# Patient Record
Sex: FEMALE | Race: WHITE | Employment: FULL TIME | URBAN - METROPOLITAN AREA
[De-identification: names, ages, dates, MRNs, and addresses within clinical notes are randomized per-mention and may not be internally consistent; named-entity substitution may affect disease eponyms.]

---

## 2017-03-22 ENCOUNTER — TELEPHONE (OUTPATIENT)
Dept: BARIATRICS/WEIGHT MGMT | Age: 57
End: 2017-03-22

## 2017-07-24 ENCOUNTER — OFFICE VISIT (OUTPATIENT)
Dept: SURGERY | Age: 57
End: 2017-07-24

## 2017-07-24 VITALS
BODY MASS INDEX: 31.21 KG/M2 | WEIGHT: 182.8 LBS | RESPIRATION RATE: 16 BRPM | SYSTOLIC BLOOD PRESSURE: 120 MMHG | HEART RATE: 67 BPM | HEIGHT: 64 IN | DIASTOLIC BLOOD PRESSURE: 72 MMHG | TEMPERATURE: 98.3 F | OXYGEN SATURATION: 98 %

## 2017-07-24 DIAGNOSIS — M79.3 PANNICULITIS: Primary | ICD-10-CM

## 2017-07-24 PROCEDURE — 99205 OFFICE O/P NEW HI 60 MIN: CPT | Performed by: SURGERY

## 2017-07-24 ASSESSMENT — ENCOUNTER SYMPTOMS
ABDOMINAL PAIN: 0
NAUSEA: 0
DOUBLE VISION: 0
PHOTOPHOBIA: 0
SORE THROAT: 0
VOMITING: 0
SHORTNESS OF BREATH: 0
BACK PAIN: 0
CONSTIPATION: 0
WHEEZING: 0
BLURRED VISION: 0
COUGH: 0
DIARRHEA: 0
HEARTBURN: 1

## 2017-08-29 ENCOUNTER — TELEPHONE (OUTPATIENT)
Dept: SURGERY | Age: 57
End: 2017-08-29

## 2017-08-29 NOTE — TELEPHONE ENCOUNTER
Per Sasha Trotter with [facility], panniculectomy has been denied as not medically necessary. The policy requires photos show pannus hangs below the pubic bone. [facility] would also need to know what conservative treatments were tried to reduce the rash. The treatments would need to have been tried for a period of 3 months or longer. Fax clinicals within 10 days to fax # [phone] and e-mail photos to [email or web address].  Reference # [id number]

## 2017-10-16 ENCOUNTER — OFFICE VISIT (OUTPATIENT)
Dept: SURGERY | Age: 57
End: 2017-10-16

## 2017-10-16 VITALS
WEIGHT: 176.8 LBS | HEIGHT: 64 IN | TEMPERATURE: 98.4 F | RESPIRATION RATE: 16 BRPM | HEART RATE: 78 BPM | BODY MASS INDEX: 30.19 KG/M2 | SYSTOLIC BLOOD PRESSURE: 124 MMHG | DIASTOLIC BLOOD PRESSURE: 76 MMHG | OXYGEN SATURATION: 99 %

## 2017-10-16 DIAGNOSIS — M79.3 PANNICULITIS: Primary | ICD-10-CM

## 2017-10-16 PROCEDURE — 99215 OFFICE O/P EST HI 40 MIN: CPT | Performed by: SURGERY

## 2017-10-16 ASSESSMENT — ENCOUNTER SYMPTOMS
SORE THROAT: 0
PHOTOPHOBIA: 0
SHORTNESS OF BREATH: 0
BACK PAIN: 0
WHEEZING: 0
HEARTBURN: 1
BLURRED VISION: 0
CONSTIPATION: 0
ABDOMINAL PAIN: 0
COUGH: 0
DIARRHEA: 0
VOMITING: 0
DOUBLE VISION: 0
NAUSEA: 0

## 2017-10-16 NOTE — PROGRESS NOTES
[facility]      CC: Body contouring    HPI: 62 y.o. female with a PMHx significant for weight loss who presents in consultation for body contouring. She underwent a sleeve 9/12 and has done well from that time period. She is still losing weight (30 lbs in the past 5 months) after she had gained some of the weight back. She has had intermittent rashes beneath her lowest abdominal fold which she has had intermittent relief with prescription cream from her PCP (yeast infections especially worse during summer months). She is interested in abdominal contouring as well as her inner thighs. She presents back as she has continued to lose weight and has reached weight stability. She has had persistent rashes without improvement by her prior therapies. .    Max weight (lbs): 283  Lowest weight (lbs): 132 (age 25)   Current (lbs): 182.8  Weight change in the past 3 months: No   Max BMI: 50.5  Current BMI: 30.8  History of DVT/PE: Yes--after hysterectomy (one in PE & two DVT), on warfarin for 4 years (has not been on medications for years - patient desired to \"opt out\" and is being managed by her PCP)     Age of obesity onset: 13 years ago (moved to [place])   Previous body contouring procedures: No                     Location: NA  Smoking: No                                  Time since quitting: NA     Pregnancy / Miscarriage: 3/0    Past Medical History:   Diagnosis Date    Anxiety     Anxiety disorder 2000    No meds needed:resolved.  Chicken pox     DVT (deep venous thrombosis) (HCC)     2 in left leg    Morbid obesity (HCC)     Nausea & vomiting     Pap smear     [id number]:nml. Under care of OB-GYN: Dr. Lynda Melgar Pulmonary embolus [facility]) 2/2008    1.5yrsxcoumadin. No meds since then. Etiology:due to KEATON as per pt'.  S/P colonoscopy 1995    Done due to \"colon spasms\":nml. Pt' declining further testing:updated 7/22/10    Screening mammogram     2005:negative.      Thyroid disease     hypo     Past Surgical History:   Procedure Laterality Date     SECTION      HYSTERECTOMY, TOTAL ABDOMINAL  2007    Due to Menometrorrhagia [626.2G][    SLEEVE GASTRECTOMY  12    TUBAL LIGATION      UPPER GASTROINTESTINAL ENDOSCOPY  8/10/12    biopsy     Social History     Social History    Marital status:      Spouse name: N/A    Number of children: N/A    Years of education: N/A     Occupational History    [employer](Foreclosure )      Social History Main Topics    Smoking status: Never Smoker    Smokeless tobacco: Never Used      Comment: Pt states she has not smoked since age 25    Alcohol use No    Drug use: No    Sexual activity: Not on file     Other Topics Concern    Not on file     Social History Narrative    No narrative on file     Family History   Problem Relation Age of Onset    Depression Mother     Seizures Brother     Stroke Maternal Grandmother     Cancer Daughter      lymphoma       Allergy:   Allergies   Allergen Reactions    Penicillins      As child    Codeine Nausea And Vomiting       ROS: Review of Systems   Constitutional: Positive for malaise/fatigue and weight loss (Intentional). Negative for chills and fever. HENT: Negative for congestion, ear pain, hearing loss, sore throat and tinnitus. Eyes: Negative for blurred vision, double vision and photophobia. Respiratory: Negative for cough, shortness of breath and wheezing. Cardiovascular: Negative for chest pain, palpitations and leg swelling. Gastrointestinal: Positive for heartburn (Occasional). Negative for abdominal pain, constipation, diarrhea, nausea and vomiting. Genitourinary: Negative for dysuria, frequency and urgency. Musculoskeletal: Positive for joint pain (Due to increase in exercise) and myalgias. Negative for back pain and neck pain. Skin: Positive for itching (Under lowest abdominal fold) and rash.    Neurological: Negative

## 2017-10-27 ENCOUNTER — HOSPITAL ENCOUNTER (OUTPATIENT)
Dept: MAMMOGRAPHY | Age: 57
Discharge: HOME | End: 2017-10-27
Attending: FAMILY MEDICINE | Admitting: FAMILY MEDICINE

## 2017-10-27 DIAGNOSIS — Z12.31 VISIT FOR SCREENING MAMMOGRAM: ICD-10-CM

## 2017-11-07 ENCOUNTER — TELEPHONE (OUTPATIENT)
Dept: SURGERY | Age: 57
End: 2017-11-07

## 2017-11-08 ENCOUNTER — OFFICE VISIT (OUTPATIENT)
Dept: SURGERY | Age: 57
End: 2017-11-08

## 2017-11-08 ENCOUNTER — HOSPITAL ENCOUNTER (OUTPATIENT)
Dept: ULTRASOUND IMAGING | Age: 57
Discharge: HOME | End: 2017-11-08
Attending: SURGERY | Admitting: SURGERY

## 2017-11-08 VITALS
SYSTOLIC BLOOD PRESSURE: 125 MMHG | HEART RATE: 67 BPM | HEIGHT: 63 IN | WEIGHT: 177 LBS | DIASTOLIC BLOOD PRESSURE: 84 MMHG | BODY MASS INDEX: 31.36 KG/M2

## 2017-11-08 DIAGNOSIS — Z76.89 ENCOUNTER TO ESTABLISH CARE: ICD-10-CM

## 2017-11-08 DIAGNOSIS — N64.59 INVERSION OF NIPPLE: ICD-10-CM

## 2017-11-08 DIAGNOSIS — Z80.3 FAMILY HISTORY OF BREAST CANCER: ICD-10-CM

## 2017-11-08 DIAGNOSIS — N64.59 INVERSION OF NIPPLE: Primary | ICD-10-CM

## 2017-11-08 PROCEDURE — 99244 OFF/OP CNSLTJ NEW/EST MOD 40: CPT | Performed by: SURGERY

## 2017-11-08 RX ORDER — KETOCONAZOLE 20 MG/G
CREAM TOPICAL
Refills: 1 | COMMUNITY
Start: 2017-10-03

## 2017-11-08 RX ORDER — ALPRAZOLAM 0.5 MG/1
TABLET ORAL
COMMUNITY
Start: 2015-12-11

## 2017-11-08 NOTE — PROGRESS NOTES
Patient Name: Christian Ross  YOB: 1960  Primary Care Physician: Jackie Gasca MD    Chief Complaint:  Patient presents secondary to complaint of nipple inversion. She reports that about 1- 1.5 months ago she noticed that the left nipple was inverted. She denies any discharge and or scaling/flaking/itching. She states that it is easily everted with manipulation and also does chandra on it's own sometimes. She denies any bilateral palpable masses/lesions. She denies any bilateral skin changes/erythema/thickening/dimpling. She does report a 50-60 pound weight loss over the last 3-6 months. She does have a family history of breast cancer in her mother diagnosed at age early 63's       Breast History:  Personal History of Breast Cancer: no  History of High Risk Lesion: no  History of Benign Breast Lesion:no  History of Previous Breast Biopsy:no  Self Breast Exams Completed:yes-monthly  Family History of Breast Cancer:yes-see above  Family History of Other Cancers:no  Ashkenazi Yazidism Decent:no  Bra Size: 45 C    Gyne History:  : 3, Para: 3, Spont: 0, Therapeutic: 0  Age of Menarche: 6 years  Age of Menopause: 52 years   LMP: N/A  Are periods regular: N/A  Age of first pregnancy: 25 years  Age of first live Birth: 25 years  Breast Feeding (total time): yes - 27 months  History of Hysterectomy / KEATON-BSO: KEATON-BSO  (age 52) secondary to pain  History of OCP's/HRT:OCPs x 15 years, discontinued ; HRT x 1 month, discontinued   Last PAP:   History of Ovarian Cancer: no  Family History of Ovarian Cancer: no  History of Gyne Surgery: yes-see above, , tubal ligation    Past Medical History:   Diagnosis Date    Anxiety     Anxiety disorder     No meds needed:resolved.  Chicken pox     DVT (deep venous thrombosis) (HCC)     2 in left leg    Morbid obesity (HCC)     Nausea & vomiting     Pap smear     [id number]:nml.   Under care of OB-GYN: Dr. Dorian Todd Pulmonary embolus ([facility] [address] 2008    1.5yrsxcoumadin. No meds since then. Etiology:due to KEATON as per pt'.  S/P colonoscopy     Done due to \"colon spasms\":nml. Pt' declining further testing:updated 7/22/10    Screening mammogram     :negative.  Thyroid disease     hypo       Past Surgical History:   Procedure Laterality Date     SECTION      HYSTERECTOMY, TOTAL ABDOMINAL  2007    Due to Menometrorrhagia [626.2G][    SLEEVE GASTRECTOMY  12    TUBAL LIGATION      UPPER GASTROINTESTINAL ENDOSCOPY  8/10/12    biopsy          Current Outpatient Prescriptions   Medication Sig Dispense Refill    ketoconazole (NIZORAL) 2 % cream APPLY TOPICALLY DAILY. 1    naltrexone-bupropion (CONTRAVE) 8-90 MG per extended release tablet Take 2 tablets by mouth 2 times daily      Multiple Vitamin (MULTIVITAMIN, BARIATRIC FUSION COMPLETE, CHEW TAB) Take 1 tablet by mouth daily.  Levothyroxine Sodium 50 MCG CAPS Take  by mouth daily.  ALPRAZolam (XANAX) 0.5 MG tablet TAKE ONE-HALF TO ONE TABLET BY MOUTH DAILY AS NEEDED       No current facility-administered medications for this visit. Social History     Social History    Marital status:      Spouse name: N/A    Number of children: N/A    Years of education: N/A     Occupational History    [employer](Foreclosure )      Social History Main Topics    Smoking status: Never Smoker    Smokeless tobacco: Never Used      Comment: Pt states she has not smoked since age 25    Alcohol use No    Drug use: No    Sexual activity: Not on file     Other Topics Concern    Not on file     Social History Narrative    No narrative on file       OBJECTIVE:    Vitals:   BP (!) 148/83   Pulse 76   Ht 5' 3\" (1.6 m)   Wt 177 lb (80.3 kg)   Breastfeeding?  No   BMI 31.35 kg/m²             ROS  Constitutional: no weight loss, fever, night sweats   Skin: negative  Cardiovascular: no chest pain or palpitations   Pulmonary: contour and tissue in her breast secondary to the weight loss. 4. Return to clinic six months, sooner if needed  5. Continue yearly mammograms  6. Continue monthly self breast exams  7. Thank you for allowing me to participate in the care of your patient. A total of 20 minutes was spent face to face/involved with this patient, Greater than 50% was spent counseling, discussing imaging findings/report, reviewing her chart and answering her questions.

## 2017-11-15 NOTE — TELEPHONE ENCOUNTER
Photos did not fax over correctly. Need to be sent to e-mail address: [email or web address]. (Call taken by Alfredo Jiménez MA.)

## 2017-11-15 NOTE — TELEPHONE ENCOUNTER
Sent clinicals + photos to e-mail address: [email or web address]. (Address originally provided was incorrect.)

## 2017-11-16 NOTE — TELEPHONE ENCOUNTER
Per Hilda Whitten, patient's panniculectomy will not be covered for several reasons:   1) patient has not been at a consistent weight for 3 months  2) there is no documentation that the rash under the abdominal fold is not responding to medication after being used for a 3 month period  3) there is no documentation that the patient is having difficulty ambulating or that the pannus is interfering with the patient's daily living  A [id number] can be done by calling # [phone]. Reference # [id number] will be requested as well as three available dates and times that the medical director can reach the provider. An appeal can be sent to fax # [phone] addressing the three issues listed above.

## 2017-11-27 NOTE — TELEPHONE ENCOUNTER
Closing case at this time. If the patient's weight remains stable for three months, case can be reopened.

## 2017-12-05 NOTE — TELEPHONE ENCOUNTER
AYDE for patient informing her that insurance has denied panniculectomy. Informed her she will need 3 months of documented weight stability and more documentation of treatment of rash. Advised patient to call the office if she has any questions or concerns.

## 2019-03-13 ENCOUNTER — TELEPHONE (OUTPATIENT)
Dept: BARIATRICS/WEIGHT MGMT | Age: 59
End: 2019-03-13

## 2022-06-30 NOTE — TELEPHONE ENCOUNTER
Do not see any photos uploaded to [employer] at this time. Cyclophosphamide Counseling:  I discussed with the patient the risks of cyclophosphamide including but not limited to hair loss, hormonal abnormalities, decreased fertility, abdominal pain, diarrhea, nausea and vomiting, bone marrow suppression and infection. The patient understands that monitoring is required while taking this medication.